# Patient Record
Sex: MALE | Race: OTHER | URBAN - METROPOLITAN AREA
[De-identification: names, ages, dates, MRNs, and addresses within clinical notes are randomized per-mention and may not be internally consistent; named-entity substitution may affect disease eponyms.]

---

## 2024-03-26 ENCOUNTER — EMERGENCY (EMERGENCY)
Facility: HOSPITAL | Age: 44
LOS: 0 days | Discharge: HOME | End: 2024-03-27
Attending: EMERGENCY MEDICINE
Payer: COMMERCIAL

## 2024-03-26 VITALS
SYSTOLIC BLOOD PRESSURE: 154 MMHG | HEIGHT: 71 IN | OXYGEN SATURATION: 98 % | TEMPERATURE: 98 F | HEART RATE: 115 BPM | RESPIRATION RATE: 20 BRPM | WEIGHT: 187.39 LBS | DIASTOLIC BLOOD PRESSURE: 106 MMHG

## 2024-03-26 VITALS
HEART RATE: 110 BPM | OXYGEN SATURATION: 98 % | RESPIRATION RATE: 20 BRPM | SYSTOLIC BLOOD PRESSURE: 146 MMHG | DIASTOLIC BLOOD PRESSURE: 97 MMHG

## 2024-03-26 DIAGNOSIS — R00.0 TACHYCARDIA, UNSPECIFIED: ICD-10-CM

## 2024-03-26 DIAGNOSIS — I10 ESSENTIAL (PRIMARY) HYPERTENSION: ICD-10-CM

## 2024-03-26 DIAGNOSIS — M79.671 PAIN IN RIGHT FOOT: ICD-10-CM

## 2024-03-26 DIAGNOSIS — M54.2 CERVICALGIA: ICD-10-CM

## 2024-03-26 DIAGNOSIS — M25.571 PAIN IN RIGHT ANKLE AND JOINTS OF RIGHT FOOT: ICD-10-CM

## 2024-03-26 DIAGNOSIS — S92.341A DISPLACED FRACTURE OF FOURTH METATARSAL BONE, RIGHT FOOT, INITIAL ENCOUNTER FOR CLOSED FRACTURE: ICD-10-CM

## 2024-03-26 DIAGNOSIS — M25.561 PAIN IN RIGHT KNEE: ICD-10-CM

## 2024-03-26 DIAGNOSIS — M25.512 PAIN IN LEFT SHOULDER: ICD-10-CM
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
## 2024-03-26 PROCEDURE — 28470 CLTX METATARSAL FX WO MNP EA: CPT | Mod: 54,T8

## 2024-03-26 PROCEDURE — 99053 MED SERV 10PM-8AM 24 HR FAC: CPT

## 2024-03-26 PROCEDURE — 99284 EMERGENCY DEPT VISIT MOD MDM: CPT | Mod: 57

## 2024-03-26 RX ORDER — METHOCARBAMOL 500 MG/1
1500 TABLET, FILM COATED ORAL ONCE
Refills: 0 | Status: COMPLETED | OUTPATIENT
Start: 2024-03-26 | End: 2024-03-26

## 2024-03-26 RX ORDER — IBUPROFEN 200 MG
1 TABLET ORAL
Qty: 20 | Refills: 0
Start: 2024-03-26 | End: 2024-03-30

## 2024-03-26 RX ORDER — METHOCARBAMOL 500 MG/1
1 TABLET, FILM COATED ORAL
Qty: 20 | Refills: 0
Start: 2024-03-26 | End: 2024-03-30

## 2024-03-26 RX ORDER — IBUPROFEN 200 MG
600 TABLET ORAL ONCE
Refills: 0 | Status: COMPLETED | OUTPATIENT
Start: 2024-03-26 | End: 2024-03-26

## 2024-03-26 RX ADMIN — METHOCARBAMOL 1500 MILLIGRAM(S): 500 TABLET, FILM COATED ORAL at 23:23

## 2024-03-26 RX ADMIN — Medication 600 MILLIGRAM(S): at 23:53

## 2024-03-26 RX ADMIN — Medication 600 MILLIGRAM(S): at 23:23
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
## 2024-03-26 NOTE — ED PROVIDER NOTE - CARE PROVIDERS DIRECT ADDRESSES
[email or web address]

## 2024-03-26 NOTE — ED PROVIDER NOTE - CARE PROVIDER_API CALL
Grant Heredia  Internal Medicine  [address] [place] [address]  Phone: [phone]  Fax: [phone]  Follow Up Time: Urgent   Grant Heredia  Internal Medicine  [address] [place] [address]  Phone: [phone]  Fax: [phone]  Follow Up Time: Urgent    Wilma Dudley  Podiatric Medicine and Surgery  [address] [place] [address]  Phone: [phone]  Fax: [phone]  Follow Up Time: Urgent

## 2024-03-26 NOTE — ED PROVIDER NOTE - PHYSICAL EXAMINATION
Vitals: HTN at 154/106, tachy at 115, otherwise WNL  Gen: AAOx3, NAD, sitting comfortably in stretcher, calm, cooperative, responsive to questions, answering appropriately   Head: ncat, perrla, eomi b/l  Neck: supple, no lymphadenopathy, no midline deviation, no midline tenderness or stepoff, + R trapezius muscle spasm  Heart: rrr, no m/r/g  Lungs: CTA b/l, no rales/ronchi/wheezes  Abd: soft, nontender, non-distended, no rebound or guarding  Ext: no clubbing/cyanosis/edema, L shoulder rom limited to 90 degrees due to pain, no gross deformity, LUE has normal pulses/cap refill/color, no gross bony deformities or any extremity, + point tenderness over R dorsal lateral foot near 5th MT  Neuro: sensation and muscle strength intact b/l, antalgic gait, but bearing full weight b/l, no focal weakness or sensory loss, CN2-12 intact b/l Vitals: HTN at 154/106, tachy at 115, otherwise WNL  Gen: AAOx3, NAD, sitting comfortably in stretcher, calm, cooperative, responsive to questions, answering appropriately   Head: ncat, perrla, eomi b/l  Neck: supple, no lymphadenopathy, no midline deviation, no midline tenderness or stepoff, + R trapezius muscle spasm, normal rom of cervical spine in all directions, normal chin to chest  Heart: rrr, no m/r/g  Lungs: CTA b/l, no rales/ronchi/wheezes  Abd: soft, nontender, non-distended, no rebound or guarding  Ext: no clubbing/cyanosis/edema, L shoulder rom limited to 90 degrees due to pain, no gross deformity, LUE has normal pulses/cap refill/color, no gross bony deformities or any extremity, + point tenderness over R dorsal lateral foot near 5th MT  Neuro: sensation and muscle strength intact b/l, antalgic gait, but bearing full weight b/l, no focal weakness or sensory loss, CN2-12 intact b/l  derm: no rash or broken skin
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
## 2024-03-26 NOTE — ED PROVIDER NOTE - PROVIDER TOKENS
PROVIDER:[TOKEN:[[id number]],FOLLOWUP:[Urgent]] PROVIDER:[TOKEN:[[id number]],FOLLOWUP:[Urgent]],PROVIDER:[TOKEN:[[id number]],FOLLOWUP:[Urgent]]

## 2024-03-26 NOTE — ED PROVIDER NOTE - PATIENT PORTAL LINK FT
You can access the FollowMyHealth Patient Portal offered by [facility] by registering at the following website: [email or web address]. By joining [employer]’s FollowMyHealth portal, you will also be able to view your health information using other applications (apps) compatible with our system.

## 2024-03-26 NOTE — ED ADULT NURSE NOTE - NSFALLUNIVINTERV_ED_ALL_ED
Bed/Stretcher in lowest position, wheels locked, appropriate side rails in place/Call bell, personal items and telephone in reach/Instruct patient to call for assistance before getting out of bed/chair/stretcher/Non-slip footwear applied when patient is off stretcher/[place] to call system/Physically safe environment - no spills, clutter or unnecessary equipment/Purposeful proactive rounding/Room/bathroom lighting operational, light cord in reach
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
## 2024-03-26 NOTE — ED PROVIDER NOTE - SECONDARY DIAGNOSIS.
Right foot pain Trapezius muscle spasm Left shoulder pain Closed fracture of base of metatarsal bone Fracture of fourth metatarsal bone of right foot

## 2024-03-26 NOTE — ED ADULT NURSE NOTE - CHPI ED NUR SYMPTOMS NEG
no acting out behaviors/no bruising/no crying/no decreased eating/drinking/no laceration/no loss of consciousness

## 2024-03-27 PROCEDURE — 72040 X-RAY EXAM NECK SPINE 2-3 VW: CPT | Mod: 26

## 2024-03-27 PROCEDURE — 73610 X-RAY EXAM OF ANKLE: CPT | Mod: 26,RT

## 2024-03-27 PROCEDURE — 73630 X-RAY EXAM OF FOOT: CPT | Mod: 26,RT

## 2024-03-27 PROCEDURE — 73030 X-RAY EXAM OF SHOULDER: CPT | Mod: 26,LT

## 2025-02-03 NOTE — ED ADULT NURSE NOTE - NS ED NURSE DC INFO COMPLEXITY
Addended by: BRIANA BEAN on: 2/3/2025 12:14 PM     Modules accepted: Orders    
Simple: Patient demonstrates quick and easy understanding/Patient asked questions/Verbalized Understanding